# Patient Record
Sex: MALE
[De-identification: names, ages, dates, MRNs, and addresses within clinical notes are randomized per-mention and may not be internally consistent; named-entity substitution may affect disease eponyms.]

---

## 2019-09-10 ENCOUNTER — APPOINTMENT (OUTPATIENT)
Dept: ORTHOPEDIC SURGERY | Facility: CLINIC | Age: 49
End: 2019-09-10
Payer: COMMERCIAL

## 2019-09-10 VITALS — BODY MASS INDEX: 20.04 KG/M2 | HEIGHT: 70 IN | WEIGHT: 140 LBS

## 2019-09-10 DIAGNOSIS — M23.92 UNSPECIFIED INTERNAL DERANGEMENT OF LEFT KNEE: ICD-10-CM

## 2019-09-10 DIAGNOSIS — M75.52 BURSITIS OF LEFT SHOULDER: ICD-10-CM

## 2019-09-10 PROBLEM — Z00.00 ENCOUNTER FOR PREVENTIVE HEALTH EXAMINATION: Status: ACTIVE | Noted: 2019-09-10

## 2019-09-10 PROCEDURE — 73030 X-RAY EXAM OF SHOULDER: CPT | Mod: LT

## 2019-09-10 PROCEDURE — 99204 OFFICE O/P NEW MOD 45 MIN: CPT

## 2019-09-10 NOTE — PHYSICAL EXAM
[de-identified] : Left shoulder shows no warmth or swelling no particular tenderness. His active range of motion is 120°/80°/30°/back pocket. There is a positive Neer impingement sign and a positive Francis test. Neurovascular exam is normal. Neck exam is normal.\par \par Left knee shows tenderness along the joint line with a possible [place] test no evidence of instability range of motion is full neurovascular exam is normal without swelling [de-identified] : Left shoulder x-ray shows no evidence of fracture or dislocation.

## 2019-09-10 NOTE — DISCUSSION/SUMMARY
[de-identified] : With regard to the left shoulder the patient's into some physical therapy apply ice and heat. I discussed over-the-counter medications as well as nonsteroidals and cortisone injection. He does have a cardiac history and we will hold off on nonsteroidals. With regard to his left knee skin have an MRI. I will call him with the results. He said 6 or 7 years of symptoms. He possibly discuss with his internal medicine doctor referral to a rheumatologist to see if the heart condition, psoriasis and left shoulder problem are somewhat related. Followup in 6 weeks

## 2019-09-10 NOTE — HISTORY OF PRESENT ILLNESS
[de-identified] : Location: left shoulder \par Quality: sharp\par Duration: 08/10/2019\par Context: atraumatic\par Aggravating Factors: over head lifting , carrying,ROM \par Conservative treatment: n.a \par A 49-year-old male with a previous myocardial infarction. He's had psoriasis shortly after that. His left shoulder started to bother him recently without any explanation for his had some stiffness. She also had previous problems with his left knee. He told me that he did have a meniscus tear 6 or 7 years ago tried some physical therapy and has continued to bother him.\par Associated Symptoms:  Stiffness \par Prior Studies: n.a \par  [Pain Location] : pain [Stable] : stable

## 2020-01-14 ENCOUNTER — APPOINTMENT (OUTPATIENT)
Dept: ORTHOPEDIC SURGERY | Facility: CLINIC | Age: 50
End: 2020-01-14